# Patient Record
(demographics unavailable — no encounter records)

---

## 2025-01-10 NOTE — HISTORY OF PRESENT ILLNESS
[de-identified] : Patient is a 64-year-old female here for evaluation of left knee pain.  Patient has been having left knee pain for about 3 weeks with no injury or trauma.  Patient states pain worsens with activities such as going up and down stairs.  Denies numbness tingling, denies instability.  Left knee exam no effusion no ecchymosis no erythema mild tenderness palpation medial joint line good range of motion good strength no gross instability neurovascular intact  X-ray bilateral knees 3 views for comparison: No acute fractures, subluxations, discussions.  Mild degenerative changes bilateral knees  Discussed with patient in detail that she has mild degenerative changes.  Discussed pain is likely due to degenerative changes/internal derangement.  Advised rest, associate range of motion exercise, physical therapy.  Patient states that a few years ago she had a cortisone injection that did well for her.  Plan is for cortisone injection left knee.  Discussed other treatment options in detail including anti-inflammatory medication, physical therapy.     Dexamethasone and Lidocaine      Anesthesia: ethyl chloride sprayed topically.    Dexamethasone 10mg/mL 2 cc   Lidocaine 2% 2cc         Medication was injected in the LT knee after verbal consent using sterile preparation and technique. The risks, benefits, and alternatives to cortisone injection were explained in full to the patient. Risks outlined include but are not limited to infection, sepsis, bleeding, scarring, skin discoloration, temporary increase in pain, syncopal episode, failure to resolve symptoms, allergic reaction, symptom recurrence, and elevation of blood sugar in diabetics. Patient understood the risks. All questions were answered. After discussion of options, patient requested an injection. Oral informed consent was obtained and sterile prep was done of the injection site. Sterile technique was utilized for the procedure including the preparation of the solutions used for the injection. Patient tolerated the procedure well. Advised to ice the injection site this evening.     Follow-up in 6 weeks for reevaluation we will discuss possible MRI and other treatment options if continues.